# Patient Record
Sex: FEMALE | Race: WHITE | NOT HISPANIC OR LATINO | Employment: OTHER | URBAN - METROPOLITAN AREA
[De-identification: names, ages, dates, MRNs, and addresses within clinical notes are randomized per-mention and may not be internally consistent; named-entity substitution may affect disease eponyms.]

---

## 2023-03-23 ENCOUNTER — TELEPHONE (OUTPATIENT)
Dept: PRIMARY CARE | Facility: CLINIC | Age: 73
End: 2023-03-23
Payer: MEDICARE

## 2023-03-23 DIAGNOSIS — I10 HYPERTENSION, UNSPECIFIED TYPE: ICD-10-CM

## 2023-03-23 NOTE — TELEPHONE ENCOUNTER
Refill:    Lisinopril 5mg daily    [phone]    LR: 12/20/22 qty 90 no refills   LV:11/20/22  NV; 05/02/23

## 2023-03-24 RX ORDER — LISINOPRIL 5 MG/1
5 TABLET ORAL DAILY
COMMUNITY
End: 2023-03-24 | Stop reason: SDUPTHER

## 2023-03-25 RX ORDER — LISINOPRIL 5 MG/1
5 TABLET ORAL DAILY
Qty: 90 TABLET | Refills: 0 | Status: SHIPPED | OUTPATIENT
Start: 2023-03-25 | End: 2023-06-22 | Stop reason: SDUPTHER

## 2023-04-12 ENCOUNTER — TELEPHONE (OUTPATIENT)
Dept: PRIMARY CARE | Facility: CLINIC | Age: 73
End: 2023-04-12
Payer: MEDICARE

## 2023-04-12 DIAGNOSIS — I10 HYPERTENSION, UNSPECIFIED TYPE: ICD-10-CM

## 2023-04-12 PROBLEM — S72.92XD CLOSED FRACTURE OF LEFT FEMUR WITH ROUTINE HEALING: Status: ACTIVE | Noted: 2023-02-10

## 2023-04-12 PROBLEM — M54.12 CERVICAL RADICULOPATHY: Status: ACTIVE | Noted: 2023-04-12

## 2023-04-12 PROBLEM — I44.7 LBBB (LEFT BUNDLE BRANCH BLOCK): Status: ACTIVE | Noted: 2021-01-20
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
## 2023-04-12 PROBLEM — R51.9 UNILATERAL HEADACHE: Status: ACTIVE | Noted: 2023-04-12

## 2023-04-12 PROBLEM — G40.909: Status: ACTIVE | Noted: 2023-04-12

## 2023-04-12 PROBLEM — I34.0 NONRHEUMATIC MITRAL VALVE REGURGITATION: Status: ACTIVE | Noted: 2021-05-04

## 2023-04-12 PROBLEM — G51.0 BELL'S PALSY: Status: ACTIVE | Noted: 2023-04-12

## 2023-04-12 PROBLEM — S50.02XA CONTUSION OF LEFT ELBOW: Status: ACTIVE | Noted: 2021-09-17

## 2023-04-12 PROBLEM — F17.200 NICOTINE USE DISORDER: Status: ACTIVE | Noted: 2021-01-21

## 2023-04-12 PROBLEM — K92.1 HEMATOCHEZIA: Status: ACTIVE | Noted: 2023-04-12

## 2023-04-12 PROBLEM — R73.9 HYPERGLYCEMIA: Status: ACTIVE | Noted: 2023-04-12

## 2023-04-12 PROBLEM — R79.0 LOW BLOOD MAGNESIUM LEVEL: Status: ACTIVE | Noted: 2023-04-12

## 2023-04-12 PROBLEM — I50.22 CHRONIC SYSTOLIC CONGESTIVE HEART FAILURE (MULTI): Status: ACTIVE | Noted: 2021-01-20

## 2023-04-12 PROBLEM — R13.13 PHARYNGEAL DYSPHAGIA: Status: ACTIVE | Noted: 2021-05-05

## 2023-04-12 PROBLEM — I35.0 AORTIC STENOSIS, MILD: Status: ACTIVE | Noted: 2021-05-04

## 2023-04-12 PROBLEM — F32.A DEPRESSION: Status: ACTIVE | Noted: 2023-04-12

## 2023-04-12 PROBLEM — I48.91 ATRIAL FIBRILLATION (MULTI): Status: ACTIVE | Noted: 2023-04-12

## 2023-04-12 PROBLEM — Q23.1 BICUSPID AORTIC VALVE (HHS-HCC): Status: ACTIVE | Noted: 2023-04-12

## 2023-04-12 PROBLEM — E44.0 MALNUTRITION OF MODERATE DEGREE (MULTI): Status: ACTIVE | Noted: 2021-06-23

## 2023-04-12 PROBLEM — B02.29 POSTHERPETIC NEURALGIA: Status: ACTIVE | Noted: 2020-06-25

## 2023-04-12 PROBLEM — M54.9 BACK PAIN: Status: ACTIVE | Noted: 2021-12-26

## 2023-04-12 PROBLEM — I48.0 PAF (PAROXYSMAL ATRIAL FIBRILLATION) (MULTI): Status: ACTIVE | Noted: 2021-06-16

## 2023-04-12 PROBLEM — S32.110A: Status: ACTIVE | Noted: 2021-12-28

## 2023-04-12 PROBLEM — E78.5 HYPERLIPIDEMIA: Status: ACTIVE | Noted: 2021-05-04

## 2023-04-12 PROBLEM — R13.14 PHARYNGOESOPHAGEAL DYSPHAGIA: Status: ACTIVE | Noted: 2018-02-23

## 2023-04-12 PROBLEM — S52.123A RADIAL HEAD FRACTURE: Status: ACTIVE | Noted: 2023-04-12

## 2023-04-12 PROBLEM — H04.123 DRY EYES: Status: ACTIVE | Noted: 2023-04-12

## 2023-04-12 PROBLEM — S83.91XA RIGHT KNEE SPRAIN: Status: ACTIVE | Noted: 2023-04-12

## 2023-04-12 PROBLEM — J30.9 ALLERGIC RHINITIS: Status: ACTIVE | Noted: 2023-04-12

## 2023-04-12 PROBLEM — N18.9 CKD (CHRONIC KIDNEY DISEASE): Chronic | Status: ACTIVE | Noted: 2021-12-28

## 2023-04-12 PROBLEM — N18.30 CKD (CHRONIC KIDNEY DISEASE), STAGE III (MULTI): Status: ACTIVE | Noted: 2023-04-12

## 2023-04-12 PROBLEM — I77.819 AORTIC ECTASIA (CMS-HCC): Status: ACTIVE | Noted: 2023-04-12

## 2023-04-12 PROBLEM — I50.9 CHF (CONGESTIVE HEART FAILURE) (MULTI): Status: ACTIVE | Noted: 2023-04-12

## 2023-04-12 PROBLEM — I36.1 NONRHEUMATIC TRICUSPID VALVE REGURGITATION: Status: ACTIVE | Noted: 2021-05-04

## 2023-04-12 PROBLEM — D64.9 ANEMIA: Status: ACTIVE | Noted: 2023-04-12

## 2023-04-12 PROBLEM — M85.80 OSTEOPENIA: Status: ACTIVE | Noted: 2023-04-12

## 2023-04-12 PROBLEM — K52.831 COLLAGENOUS COLITIS: Status: ACTIVE | Noted: 2023-04-12

## 2023-04-12 PROBLEM — R26.9 ABNORMALITY OF GAIT: Status: ACTIVE | Noted: 2023-02-10

## 2023-04-12 PROBLEM — E55.9 VITAMIN D DEFICIENCY: Status: ACTIVE | Noted: 2023-04-12

## 2023-04-12 PROBLEM — F43.21 GRIEF: Status: ACTIVE | Noted: 2023-04-12

## 2023-04-12 RX ORDER — HYDRALAZINE HYDROCHLORIDE 50 MG/1
50 TABLET, FILM COATED ORAL 3 TIMES DAILY
COMMUNITY
End: 2023-04-12 | Stop reason: SDUPTHER

## 2023-04-12 RX ORDER — HYDRALAZINE HYDROCHLORIDE 50 MG/1
50 TABLET, FILM COATED ORAL 3 TIMES DAILY
Qty: 270 TABLET | Refills: 0 | Status: SHIPPED | OUTPATIENT
Start: 2023-04-12 | End: 2023-10-16 | Stop reason: SDUPTHER

## 2023-04-12 NOTE — TELEPHONE ENCOUNTER
Refill:     Hydralazine 50mg take one tab three times a day    [facility] # [phone]    LR: 01/05/23 qty 270 no refills   LV: 11/02/22   NV: 05/02/23

## 2023-05-01 RX ORDER — SIMVASTATIN 10 MG/1
10 TABLET, FILM COATED ORAL NIGHTLY
COMMUNITY
End: 2023-05-26

## 2023-05-01 RX ORDER — MAGNESIUM 64 MG (MAGNESIUM CHLORIDE) TABLET,DELAYED RELEASE
64 2 TIMES DAILY
COMMUNITY

## 2023-05-01 RX ORDER — PANTOPRAZOLE SODIUM 40 MG/1
TABLET, DELAYED RELEASE ORAL
COMMUNITY

## 2023-05-01 RX ORDER — ALLOPURINOL 100 MG/1
100 TABLET ORAL DAILY
COMMUNITY
End: 2023-06-16 | Stop reason: SDUPTHER

## 2023-05-01 RX ORDER — FUROSEMIDE 20 MG/1
20 TABLET ORAL DAILY
COMMUNITY
End: 2024-01-16 | Stop reason: SDUPTHER

## 2023-05-01 RX ORDER — APIXABAN 5 MG/1
5 TABLET, FILM COATED ORAL 2 TIMES DAILY
COMMUNITY

## 2023-05-01 RX ORDER — ALENDRONATE SODIUM 70 MG/1
70 TABLET ORAL WEEKLY
COMMUNITY
Start: 2023-01-09 | End: 2023-08-22 | Stop reason: SDUPTHER

## 2023-05-01 RX ORDER — FERROUS SULFATE 325(65) MG
1 TABLET ORAL 2 TIMES DAILY
COMMUNITY

## 2023-05-01 RX ORDER — TRAZODONE HYDROCHLORIDE 50 MG/1
50 TABLET ORAL NIGHTLY PRN
COMMUNITY
End: 2024-05-07 | Stop reason: SINTOL

## 2023-05-01 RX ORDER — OXYCODONE HYDROCHLORIDE 5 MG/1
TABLET ORAL
COMMUNITY
Start: 2023-04-17 | End: 2023-05-02 | Stop reason: ALTCHOICE

## 2023-05-01 RX ORDER — METOPROLOL SUCCINATE 50 MG/1
50 TABLET, EXTENDED RELEASE ORAL
COMMUNITY
Start: 2023-02-01 | End: 2023-05-02 | Stop reason: SDUPTHER

## 2023-05-02 ENCOUNTER — OFFICE VISIT (OUTPATIENT)
Dept: PRIMARY CARE | Facility: CLINIC | Age: 73
End: 2023-05-02
Payer: MEDICARE

## 2023-05-02 VITALS
SYSTOLIC BLOOD PRESSURE: 122 MMHG | BODY MASS INDEX: 24.73 KG/M2 | HEIGHT: 61 IN | TEMPERATURE: 96 F | DIASTOLIC BLOOD PRESSURE: 70 MMHG | WEIGHT: 131 LBS

## 2023-05-02 DIAGNOSIS — E55.9 VITAMIN D DEFICIENCY: ICD-10-CM

## 2023-05-02 DIAGNOSIS — D12.6 ADENOMATOUS POLYP OF COLON, UNSPECIFIED PART OF COLON: ICD-10-CM

## 2023-05-02 DIAGNOSIS — I35.0 AORTIC STENOSIS, MILD: ICD-10-CM

## 2023-05-02 DIAGNOSIS — I50.22 CHRONIC SYSTOLIC CONGESTIVE HEART FAILURE (MULTI): ICD-10-CM

## 2023-05-02 DIAGNOSIS — I34.0 NONRHEUMATIC MITRAL VALVE REGURGITATION: ICD-10-CM

## 2023-05-02 DIAGNOSIS — Z12.31 ENCOUNTER FOR SCREENING MAMMOGRAM FOR MALIGNANT NEOPLASM OF BREAST: ICD-10-CM

## 2023-05-02 DIAGNOSIS — N18.31 ANEMIA DUE TO STAGE 3A CHRONIC KIDNEY DISEASE (MULTI): ICD-10-CM

## 2023-05-02 DIAGNOSIS — M85.80 OSTEOPENIA, UNSPECIFIED LOCATION: ICD-10-CM

## 2023-05-02 DIAGNOSIS — I10 HYPERTENSION, UNSPECIFIED TYPE: ICD-10-CM

## 2023-05-02 DIAGNOSIS — I48.11 LONGSTANDING PERSISTENT ATRIAL FIBRILLATION (MULTI): ICD-10-CM

## 2023-05-02 DIAGNOSIS — E78.5 HYPERLIPIDEMIA, UNSPECIFIED HYPERLIPIDEMIA TYPE: ICD-10-CM

## 2023-05-02 DIAGNOSIS — F17.200 NICOTINE USE DISORDER: ICD-10-CM

## 2023-05-02 DIAGNOSIS — Z00.00 ROUTINE GENERAL MEDICAL EXAMINATION AT HEALTH CARE FACILITY: Primary | ICD-10-CM

## 2023-05-02 DIAGNOSIS — Z00.00 HEALTH CARE MAINTENANCE: ICD-10-CM

## 2023-05-02 DIAGNOSIS — I36.1 NONRHEUMATIC TRICUSPID VALVE REGURGITATION: ICD-10-CM

## 2023-05-02 DIAGNOSIS — M54.12 CERVICAL RADICULOPATHY: ICD-10-CM

## 2023-05-02 DIAGNOSIS — D63.1 ANEMIA DUE TO STAGE 3A CHRONIC KIDNEY DISEASE (MULTI): ICD-10-CM

## 2023-05-02 DIAGNOSIS — R73.9 HYPERGLYCEMIA: ICD-10-CM

## 2023-05-02 PROBLEM — G72.41: Status: ACTIVE | Noted: 2023-05-02

## 2023-05-02 PROBLEM — N18.30 CKD (CHRONIC KIDNEY DISEASE), STAGE III (MULTI): Chronic | Status: ACTIVE | Noted: 2021-12-28

## 2023-05-02 PROBLEM — K92.1 HEMATOCHEZIA: Status: RESOLVED | Noted: 2023-04-12 | Resolved: 2023-05-02
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
## 2023-05-02 PROBLEM — R51.9 UNILATERAL HEADACHE: Status: RESOLVED | Noted: 2023-04-12 | Resolved: 2023-05-02

## 2023-05-02 PROCEDURE — 3078F DIAST BP <80 MM HG: CPT | Performed by: FAMILY MEDICINE

## 2023-05-02 PROCEDURE — G0442 ANNUAL ALCOHOL SCREEN 15 MIN: HCPCS | Performed by: FAMILY MEDICINE

## 2023-05-02 PROCEDURE — 1159F MED LIST DOCD IN RCRD: CPT | Performed by: FAMILY MEDICINE

## 2023-05-02 PROCEDURE — G0439 PPPS, SUBSEQ VISIT: HCPCS | Performed by: FAMILY MEDICINE

## 2023-05-02 PROCEDURE — 99497 ADVNCD CARE PLAN 30 MIN: CPT | Performed by: FAMILY MEDICINE

## 2023-05-02 PROCEDURE — G0446 INTENS BEHAVE THER CARDIO DX: HCPCS | Performed by: FAMILY MEDICINE

## 2023-05-02 PROCEDURE — 1160F RVW MEDS BY RX/DR IN RCRD: CPT | Performed by: FAMILY MEDICINE

## 2023-05-02 PROCEDURE — 99214 OFFICE O/P EST MOD 30 MIN: CPT | Performed by: FAMILY MEDICINE

## 2023-05-02 PROCEDURE — 1123F ACP DISCUSS/DSCN MKR DOCD: CPT | Performed by: FAMILY MEDICINE

## 2023-05-02 PROCEDURE — G0444 DEPRESSION SCREEN ANNUAL: HCPCS | Performed by: FAMILY MEDICINE

## 2023-05-02 PROCEDURE — 3074F SYST BP LT 130 MM HG: CPT | Performed by: FAMILY MEDICINE

## 2023-05-02 PROCEDURE — 1170F FXNL STATUS ASSESSED: CPT | Performed by: FAMILY MEDICINE

## 2023-05-02 RX ORDER — METOPROLOL SUCCINATE 50 MG/1
50 TABLET, EXTENDED RELEASE ORAL
Qty: 90 TABLET | Refills: 1 | Status: SHIPPED | OUTPATIENT
Start: 2023-05-02 | End: 2023-10-31 | Stop reason: SDUPTHER

## 2023-05-02 ASSESSMENT — ACTIVITIES OF DAILY LIVING (ADL)
BATHING: INDEPENDENT
DOING_HOUSEWORK: INDEPENDENT
MANAGING_FINANCES: INDEPENDENT
GROCERY_SHOPPING: INDEPENDENT
DRESSING: INDEPENDENT
TAKING_MEDICATION: INDEPENDENT

## 2023-05-02 ASSESSMENT — ENCOUNTER SYMPTOMS
OCCASIONAL FEELINGS OF UNSTEADINESS: 0
DEPRESSION: 0
LOSS OF SENSATION IN FEET: 0

## 2023-05-02 ASSESSMENT — PATIENT HEALTH QUESTIONNAIRE - PHQ9
SUM OF ALL RESPONSES TO PHQ9 QUESTIONS 1 AND 2: 0
1. LITTLE INTEREST OR PLEASURE IN DOING THINGS: NOT AT ALL
2. FEELING DOWN, DEPRESSED OR HOPELESS: NOT AT ALL

## 2023-05-02 NOTE — PATIENT INSTRUCTIONS
Quitting smoking is the single most important thing you can do for your health.  We can help.     Let me know how your physical therapy goes to prevent falling.    Follow up fasting (no alcohol for 48 hours and just water for 14 hours) in six months for your next routine appointment.  In general, take any medications on schedule (except for types of Insulin).

## 2023-05-02 NOTE — PROGRESS NOTES
Subjective   Reason for Visit: Lianna Nuñez is an 72 y.o. female here for a Medicare Wellness visit.     Past Medical, Surgical, and Family History reviewed and updated in chart.In the past 2 weeks this patient has not felt down, depressed, hopeless, lost interest or pleasure in doing things or thought of harming herself or others. The patient has not fallen in the last six months and has no loose rugs,  uneven floors or poor lighting at home.Advance Care Planning discussion was held.  The patient has no spiritual/Moravian/cultural values or needs that we need to know. The patient does not feel threatened or abused physically, emotionally or sexually.  The patient feels safe in the home.  In the past month, there was not a day when the patient of anyone in the patient's family went hungry because there was not enough food.  The patient denies that they or the person with them has problems with hearing, speaking, reading, moving around or learning.  The patient states they are comfortable filling out medical forms.        Reviewed all medications by prescribing practitioner or clinical pharmacist (such as prescriptions, OTCs, herbal therapies and supplements) and documented in the medical record.    HPI  Taking meds as directed without tolerability or affordability issues; no complaints today.    Cardiovascular risk discussed and, if needed, lifestyle modifications recommended, including nutritional choices, exercise, and elimination of habits contributing to risk.  We agreed on a plan to reduce  the current cardiovascular risk.  See the ASCVD   Plus for data  discussed regarding risk and risk reduction opportunities.  Aspirin use/disuse was discussed after reviewing updated guidelines.    Patient Care Team:  Roberto Marshall MD as PCP - General  Roberto Marshall MD as PCP - [employer] PCP     Review of Systems  Cardiovascular- No chest pain or pressure, nausea, diaphoresis, paresthesias,  "dizziness, or syncope with or without exertion  Gastrointestinal-No blood in stool, tarry stools, pain, vomiting, heartburn, constipation or diarrhea  Pulmonary-No wheezing, coughing or shortness of breath  Urology-No frequency, urgency, blood in urine, or incontinence  Musculoskeletal-No locking, giving way/swelling of joints  Neurology- No headaches, hx of concussion, falls in the last year or seizure;  weakness progressing in her left hand follwed by Dr Martin at [facility]  Allergy/Immunology-No history of sneezing or itching  Dermatology-No rashes, blanching or  change in any moles    Objective   Vitals:  /70 (BP Location: Left arm, Patient Position: Sitting)   Temp 35.6 °C (96 °F) (Skin)   Ht 1.549 m (5' 1\")   Wt 59.4 kg (131 lb)   BMI 24.75 kg/m²       Physical Exam  Neck-supple without lymphadenopathy or thyromegaly; no carotid bruits  Heart- regular rate and rhythm, normal s1 and s2 without  gallop;  no edema; grade II systolic murmur on both sides of sternal border  Lungs-clear to auscultation  Abdomen-soft, positive bowel sounds, without masses, HSmegaly or pain   Neuro- has no strength in third, fourth and fifth fingers of the left hand  Assessment/Plan   Problem List Items Addressed This Visit          Nervous    Cervical radiculopathy       Circulatory    Aortic stenosis, mild    Overview     Last Assessment & Plan: Formatting of this note might be different from the original. Assessment: mild. Follows with cardiology         Relevant Medications    metoprolol succinate XL (Toprol-XL) 50 mg 24 hr tablet    Chronic systolic congestive heart failure (CMS/HCC)    Overview     Last Assessment & Plan: Formatting of this note might be different from the original. Assessment: stable PLAN: continue meds,         Relevant Medications    metoprolol succinate XL (Toprol-XL) 50 mg 24 hr tablet    Hypertension    Overview     Last Assessment & Plan: Formatting of this note might be different from the original. " Assessment: stable PLAN: on meds, monitor post op         Relevant Orders    Comprehensive Metabolic Panel    Lipid Panel    Thyroid Stimulating Hormone    Nonrheumatic mitral valve regurgitation    Overview     Last Assessment & Plan: Formatting of this note might be different from the original. Assessment: stable PLAN: follows cardiology         Relevant Medications    metoprolol succinate XL (Toprol-XL) 50 mg 24 hr tablet    Nonrheumatic tricuspid valve regurgitation    Overview     Last Assessment & Plan: Formatting of this note might be different from the original. Assessment: stable PLAN: follows cardiology         Relevant Medications    metoprolol succinate XL (Toprol-XL) 50 mg 24 hr tablet       Musculoskeletal    Osteopenia       Endocrine/Metabolic    Vitamin D deficiency    Relevant Orders    Vitamin D 1,25 Dihydroxy       Hematologic    Anemia       Other    Hyperglycemia    Relevant Orders    Glucose Tolerance Test, 2 hour (Non-Pregnancy)    Hyperlipidemia    Overview     Last Assessment & Plan: Formatting of this note might be different from the original. Assessment: on simvastatin         Relevant Orders    Comprehensive Metabolic Panel    Lipid Panel    Thyroid Stimulating Hormone    Nicotine use disorder    Overview     Last Assessment & Plan: Formatting of this note might be different from the original. Assessment: down to smoking <5 cigarettes daily          Other Visit Diagnoses       Routine general medical examination at health care facility    -  Primary    Encounter for screening mammogram for malignant neoplasm of breast        Relevant Orders    BI mammo bilateral screening tomosynthesis    Longstanding persistent atrial fibrillation (CMS/HCC)        Relevant Medications    metoprolol succinate XL (Toprol-XL) 50 mg 24 hr tablet

## 2023-05-08 ENCOUNTER — LAB (OUTPATIENT)
Dept: LAB | Facility: LAB | Age: 73
End: 2023-05-08
Payer: MEDICARE

## 2023-05-08 DIAGNOSIS — N18.31 ANEMIA DUE TO STAGE 3A CHRONIC KIDNEY DISEASE (MULTI): ICD-10-CM

## 2023-05-08 DIAGNOSIS — R73.9 HYPERGLYCEMIA: ICD-10-CM

## 2023-05-08 DIAGNOSIS — D63.1 ANEMIA DUE TO STAGE 3A CHRONIC KIDNEY DISEASE (MULTI): ICD-10-CM

## 2023-05-08 DIAGNOSIS — E55.9 VITAMIN D DEFICIENCY: ICD-10-CM

## 2023-05-08 DIAGNOSIS — I10 HYPERTENSION, UNSPECIFIED TYPE: ICD-10-CM

## 2023-05-08 DIAGNOSIS — E78.5 HYPERLIPIDEMIA, UNSPECIFIED HYPERLIPIDEMIA TYPE: ICD-10-CM

## 2023-05-08 LAB
BASOPHILS (10*3/UL) IN BLOOD BY AUTOMATED COUNT: 0.02 X10E9/L (ref 0–0.1)
BASOPHILS/100 LEUKOCYTES IN BLOOD BY AUTOMATED COUNT: 0.3 % (ref 0–2)
CHOLESTEROL (MG/DL) IN SER/PLAS: 118 MG/DL (ref 0–199)
CHOLESTEROL IN HDL (MG/DL) IN SER/PLAS: 51.8 MG/DL
CHOLESTEROL/HDL RATIO: 2.3
EOSINOPHILS (10*3/UL) IN BLOOD BY AUTOMATED COUNT: 0.06 X10E9/L (ref 0–0.4)
EOSINOPHILS/100 LEUKOCYTES IN BLOOD BY AUTOMATED COUNT: 0.9 % (ref 0–6)
ERYTHROCYTE DISTRIBUTION WIDTH (RATIO) BY AUTOMATED COUNT: 16.2 % (ref 11.5–14.5)
ERYTHROCYTE MEAN CORPUSCULAR HEMOGLOBIN CONCENTRATION (G/DL) BY AUTOMATED: 30.6 G/DL (ref 32–36)
ERYTHROCYTE MEAN CORPUSCULAR VOLUME (FL) BY AUTOMATED COUNT: 108 FL (ref 80–100)
ERYTHROCYTES (10*6/UL) IN BLOOD BY AUTOMATED COUNT: 2.78 X10E12/L (ref 4–5.2)
GLUCOSE TOLERANCE TEST FASTING: 93 MG/DL
GLUCOSE TOLERANCE TEST TWO HOUR: 96 MG/DL
GTTC3: NORMAL
HEMATOCRIT (%) IN BLOOD BY AUTOMATED COUNT: 30.1 % (ref 36–46)
HEMOGLOBIN (G/DL) IN BLOOD: 9.2 G/DL (ref 12–16)
IMMATURE GRANULOCYTES/100 LEUKOCYTES IN BLOOD BY AUTOMATED COUNT: 0.4 % (ref 0–0.9)
LDL: 45 MG/DL (ref 0–99)
LEUKOCYTES (10*3/UL) IN BLOOD BY AUTOMATED COUNT: 6.7 X10E9/L (ref 4.4–11.3)
LYMPHOCYTES (10*3/UL) IN BLOOD BY AUTOMATED COUNT: 1.52 X10E9/L (ref 0.8–3)
LYMPHOCYTES/100 LEUKOCYTES IN BLOOD BY AUTOMATED COUNT: 22.7 % (ref 13–44)
MONOCYTES (10*3/UL) IN BLOOD BY AUTOMATED COUNT: 0.6 X10E9/L (ref 0.05–0.8)
MONOCYTES/100 LEUKOCYTES IN BLOOD BY AUTOMATED COUNT: 9 % (ref 2–10)
NEUTROPHILS (10*3/UL) IN BLOOD BY AUTOMATED COUNT: 4.46 X10E9/L (ref 1.6–5.5)
NEUTROPHILS/100 LEUKOCYTES IN BLOOD BY AUTOMATED COUNT: 66.7 % (ref 40–80)
NRBC (PER 100 WBCS) BY AUTOMATED COUNT: 0 /100 WBC (ref 0–0)
PLATELETS (10*3/UL) IN BLOOD AUTOMATED COUNT: 283 X10E9/L (ref 150–450)
THYROTROPIN (MIU/L) IN SER/PLAS BY DETECTION LIMIT <= 0.05 MIU/L: 3.44 MIU/L (ref 0.44–3.98)
TRIGLYCERIDE (MG/DL) IN SER/PLAS: 106 MG/DL (ref 0–149)
VLDL: 21 MG/DL (ref 0–40)

## 2023-05-08 PROCEDURE — 80053 COMPREHEN METABOLIC PANEL: CPT

## 2023-05-08 PROCEDURE — 82306 VITAMIN D 25 HYDROXY: CPT

## 2023-05-08 PROCEDURE — 84443 ASSAY THYROID STIM HORMONE: CPT

## 2023-05-08 PROCEDURE — 85025 COMPLETE CBC W/AUTO DIFF WBC: CPT

## 2023-05-08 PROCEDURE — 80061 LIPID PANEL: CPT

## 2023-05-08 PROCEDURE — 82947 ASSAY GLUCOSE BLOOD QUANT: CPT

## 2023-05-08 PROCEDURE — 36415 COLL VENOUS BLD VENIPUNCTURE: CPT

## 2023-05-08 PROCEDURE — 82652 VIT D 1 25-DIHYDROXY: CPT

## 2023-05-08 PROCEDURE — 82950 GLUCOSE TEST: CPT

## 2023-05-09 LAB
ALANINE AMINOTRANSFERASE (SGPT) (U/L) IN SER/PLAS: 13 U/L (ref 7–45)
ALBUMIN (G/DL) IN SER/PLAS: 4 G/DL (ref 3.4–5)
ALKALINE PHOSPHATASE (U/L) IN SER/PLAS: 83 U/L (ref 33–136)
ANION GAP IN SER/PLAS: 17 MMOL/L (ref 10–20)
ASPARTATE AMINOTRANSFERASE (SGOT) (U/L) IN SER/PLAS: 23 U/L (ref 9–39)
BILIRUBIN TOTAL (MG/DL) IN SER/PLAS: 0.4 MG/DL (ref 0–1.2)
CALCIUM (MG/DL) IN SER/PLAS: 9.9 MG/DL (ref 8.6–10.6)
CARBON DIOXIDE, TOTAL (MMOL/L) IN SER/PLAS: 25 MMOL/L (ref 21–32)
CHLORIDE (MMOL/L) IN SER/PLAS: 103 MMOL/L (ref 98–107)
CREATININE (MG/DL) IN SER/PLAS: 1.14 MG/DL (ref 0.5–1.05)
GFR FEMALE: 51 ML/MIN/1.73M2
GLUCOSE (MG/DL) IN SER/PLAS: 87 MG/DL (ref 74–99)
POTASSIUM (MMOL/L) IN SER/PLAS: 5.2 MMOL/L (ref 3.5–5.3)
PROTEIN TOTAL: 6.9 G/DL (ref 6.4–8.2)
SODIUM (MMOL/L) IN SER/PLAS: 140 MMOL/L (ref 136–145)
UREA NITROGEN (MG/DL) IN SER/PLAS: 28 MG/DL (ref 6–23)

## 2023-05-10 LAB — VITAMIN D 1,25-DIHYDROXY: 24.6 PG/ML (ref 19.9–79.3)

## 2023-05-11 LAB — CALCIDIOL (25 OH VITAMIN D3) (NG/ML) IN SER/PLAS: 39 NG/ML

## 2023-06-15 ENCOUNTER — TELEPHONE (OUTPATIENT)
Dept: PRIMARY CARE | Facility: CLINIC | Age: 73
End: 2023-06-15
Payer: MEDICARE

## 2023-06-15 DIAGNOSIS — N18.30 STAGE 3 CHRONIC KIDNEY DISEASE, UNSPECIFIED WHETHER STAGE 3A OR 3B CKD (MULTI): Chronic | ICD-10-CM

## 2023-06-15 NOTE — TELEPHONE ENCOUNTER
Refill:    Allopurinol 100mg daily    [facility] [phone]    LR: 11/02/22 qty 90 w/ 1 refill  LV: 05/02/23   NV: 11/03/23

## 2023-06-16 RX ORDER — ALLOPURINOL 100 MG/1
100 TABLET ORAL DAILY
Qty: 90 TABLET | Refills: 0 | Status: SHIPPED | OUTPATIENT
Start: 2023-06-16 | End: 2023-09-14 | Stop reason: SDUPTHER

## 2023-06-22 ENCOUNTER — TELEPHONE (OUTPATIENT)
Dept: PRIMARY CARE | Facility: CLINIC | Age: 73
End: 2023-06-22
Payer: MEDICARE

## 2023-06-22 DIAGNOSIS — I10 HYPERTENSION, UNSPECIFIED TYPE: ICD-10-CM

## 2023-06-22 NOTE — TELEPHONE ENCOUNTER
Refill:    Lisinopril 5mg daily    [facility] [phone]    LR: 03/25/23 qty 90 no refill  LV: 05/08/23   NV: 11/03/23   povidone iodine

## 2023-06-26 RX ORDER — LISINOPRIL 5 MG/1
5 TABLET ORAL DAILY
Qty: 90 TABLET | Refills: 0 | Status: SHIPPED | OUTPATIENT
Start: 2023-06-26 | End: 2023-09-14 | Stop reason: SDUPTHER

## 2023-07-24 ENCOUNTER — TELEPHONE (OUTPATIENT)
Dept: PRIMARY CARE | Facility: CLINIC | Age: 73
End: 2023-07-24
Payer: MEDICARE

## 2023-07-24 DIAGNOSIS — M85.80 OSTEOPENIA, UNSPECIFIED LOCATION: ICD-10-CM

## 2023-07-24 NOTE — TELEPHONE ENCOUNTER
Pt is requesting a refill on      Calcium carbonate-Vit D  500-5 mcg powder packet bid   LR 01/09/2023  # 180 x 1    [facility]  [phone]  NV  11/03/2023

## 2023-08-03 ENCOUNTER — TELEPHONE (OUTPATIENT)
Dept: PRIMARY CARE | Facility: CLINIC | Age: 73
End: 2023-08-03
Payer: MEDICARE

## 2023-08-03 DIAGNOSIS — M85.80 OSTEOPENIA, UNSPECIFIED LOCATION: ICD-10-CM

## 2023-08-03 RX ORDER — MULTIVITAMIN
1 TABLET ORAL 2 TIMES DAILY
Qty: 60 TABLET | Refills: 0 | Status: SHIPPED | OUTPATIENT
Start: 2023-08-03 | End: 2023-09-02

## 2023-08-03 NOTE — TELEPHONE ENCOUNTER
Pt is calling she states that she called in for a refill for her for Calcium 600mg +D3 -600-10mg take one tab twice a day and it was sent in powder form. Can this please be resent as tablet form    [facility] [phone]    LR: 01/09/23 qty 180 w/ 1 refill  LV: 01/09/23  NV: 11/03/23

## 2023-08-21 ENCOUNTER — TELEPHONE (OUTPATIENT)
Dept: PRIMARY CARE | Facility: CLINIC | Age: 73
End: 2023-08-21
Payer: MEDICARE

## 2023-08-21 DIAGNOSIS — M85.80 OSTEOPENIA, UNSPECIFIED LOCATION: ICD-10-CM

## 2023-08-21 NOTE — TELEPHONE ENCOUNTER
REFILL  MEDICATION:     Alendronate 70 MG; Take 1 tablet once a week.     [facility]: [phone]     LR: 1-9-23  12 tablets with 1 refill  LV: 5-2-23  NV: 11-3-23

## 2023-08-23 RX ORDER — ALENDRONATE SODIUM 70 MG/1
70 TABLET ORAL
Qty: 12 TABLET | Refills: 0 | Status: SHIPPED | OUTPATIENT
Start: 2023-08-23 | End: 2024-02-05 | Stop reason: SDUPTHER

## 2023-09-08 DIAGNOSIS — E78.5 HYPERLIPIDEMIA, UNSPECIFIED: ICD-10-CM

## 2023-09-08 RX ORDER — SIMVASTATIN 10 MG/1
TABLET, FILM COATED ORAL
Qty: 90 TABLET | Refills: 0 | Status: SHIPPED | OUTPATIENT
Start: 2023-09-08 | End: 2024-04-11 | Stop reason: SDUPTHER

## 2023-09-14 ENCOUNTER — TELEPHONE (OUTPATIENT)
Dept: PRIMARY CARE | Facility: CLINIC | Age: 73
End: 2023-09-14
Payer: MEDICARE

## 2023-09-14 DIAGNOSIS — N18.30 STAGE 3 CHRONIC KIDNEY DISEASE, UNSPECIFIED WHETHER STAGE 3A OR 3B CKD (MULTI): Chronic | ICD-10-CM

## 2023-09-14 DIAGNOSIS — I10 HYPERTENSION, UNSPECIFIED TYPE: ICD-10-CM

## 2023-09-14 RX ORDER — ALLOPURINOL 100 MG/1
100 TABLET ORAL DAILY
Qty: 90 TABLET | Refills: 0 | Status: SHIPPED | OUTPATIENT
Start: 2023-09-14 | End: 2023-12-13

## 2023-09-14 RX ORDER — LISINOPRIL 5 MG/1
5 TABLET ORAL DAILY
Qty: 90 TABLET | Refills: 0 | Status: SHIPPED | OUTPATIENT
Start: 2023-09-14 | End: 2023-11-03

## 2023-09-14 NOTE — TELEPHONE ENCOUNTER
Refill:    1) Lisinopril 5mg daily    2)Allopurinol 100mg daily    [facility] # [phone]    LR: 06/26/23 qty 90 no refills  LV: 05/02/23  NV: 11/03/23

## 2023-10-16 DIAGNOSIS — I10 HYPERTENSION, UNSPECIFIED TYPE: ICD-10-CM

## 2023-10-16 RX ORDER — HYDRALAZINE HYDROCHLORIDE 50 MG/1
50 TABLET, FILM COATED ORAL 3 TIMES DAILY
Qty: 90 TABLET | Refills: 0 | Status: SHIPPED | OUTPATIENT
Start: 2023-10-16 | End: 2023-11-13 | Stop reason: SDUPTHER

## 2023-10-16 NOTE — TELEPHONE ENCOUNTER
Dr. BLACKWOOD 's pt needs a refill and Dr. PETTY pt is covering     Refill:    Hydralazine 50mg take one tab 3 times a day    [facility] [phone]    LR: 04/12/23 qty 270 no refills  LV: 09/11/23   NV: 11/03/23

## 2023-10-31 ENCOUNTER — TELEPHONE (OUTPATIENT)
Dept: PRIMARY CARE | Facility: CLINIC | Age: 73
End: 2023-10-31
Payer: MEDICARE

## 2023-10-31 DIAGNOSIS — I10 HYPERTENSION, UNSPECIFIED TYPE: ICD-10-CM

## 2023-10-31 RX ORDER — METOPROLOL SUCCINATE 50 MG/1
50 TABLET, EXTENDED RELEASE ORAL DAILY
Qty: 90 TABLET | Refills: 1 | Status: SHIPPED | OUTPATIENT
Start: 2023-10-31 | End: 2024-05-07

## 2023-10-31 NOTE — TELEPHONE ENCOUNTER
Refill:    Metoprolol Succ XL 50mg daily    [facility] [phone]    LR: 05/02/23 qty 90 no refills  LV: 05/02/23  NV: 11/03/23

## 2023-11-03 ENCOUNTER — OFFICE VISIT (OUTPATIENT)
Dept: PRIMARY CARE | Facility: CLINIC | Age: 73
End: 2023-11-03
Payer: MEDICARE

## 2023-11-03 VITALS
SYSTOLIC BLOOD PRESSURE: 134 MMHG | DIASTOLIC BLOOD PRESSURE: 91 MMHG | BODY MASS INDEX: 24.94 KG/M2 | WEIGHT: 132 LBS | TEMPERATURE: 97.8 F

## 2023-11-03 DIAGNOSIS — I10 HYPERTENSION, UNSPECIFIED TYPE: ICD-10-CM

## 2023-11-03 DIAGNOSIS — Z23 ENCOUNTER FOR IMMUNIZATION: ICD-10-CM

## 2023-11-03 DIAGNOSIS — E78.5 HYPERLIPIDEMIA, UNSPECIFIED HYPERLIPIDEMIA TYPE: ICD-10-CM

## 2023-11-03 DIAGNOSIS — D63.1 ANEMIA DUE TO STAGE 3A CHRONIC KIDNEY DISEASE (MULTI): ICD-10-CM

## 2023-11-03 DIAGNOSIS — G72.41 INCLUSION BODY MYOSITIS (IBM) (CMS-HCC): Primary | ICD-10-CM

## 2023-11-03 DIAGNOSIS — M46.1 SACROILIITIS, NOT ELSEWHERE CLASSIFIED (CMS-HCC): ICD-10-CM

## 2023-11-03 DIAGNOSIS — R73.9 HYPERGLYCEMIA: Primary | ICD-10-CM

## 2023-11-03 DIAGNOSIS — N18.31 ANEMIA DUE TO STAGE 3A CHRONIC KIDNEY DISEASE (MULTI): ICD-10-CM

## 2023-11-03 DIAGNOSIS — G40.909: ICD-10-CM

## 2023-11-03 DIAGNOSIS — R26.9 ABNORMALITY OF GAIT: ICD-10-CM

## 2023-11-03 LAB
POC FINGERSTICK BLOOD GLUCOSE: 125 MG/DL (ref 70–100)
POC HDL CHOLESTEROL: 49 MG/DL (ref 0–50)
POC LDL CHOLESTEROL: 42 MG/DL (ref 0–100)
POC NON-HDL CHOLESTEROL: 71 MG/DL (ref 0–130)
POC TOTAL CHOLESTEROL/HDL RATIO: 2.5 (ref 0–4.5)
POC TOTAL CHOLESTEROL: 120 MG/DL (ref 0–199)
POC TRIGLYCERIDES: 147 MG/DL (ref 0–150)

## 2023-11-03 PROCEDURE — 82962 GLUCOSE BLOOD TEST: CPT | Performed by: FAMILY MEDICINE

## 2023-11-03 PROCEDURE — 3080F DIAST BP >= 90 MM HG: CPT | Performed by: FAMILY MEDICINE

## 2023-11-03 PROCEDURE — 90662 IIV NO PRSV INCREASED AG IM: CPT | Performed by: FAMILY MEDICINE

## 2023-11-03 PROCEDURE — 3075F SYST BP GE 130 - 139MM HG: CPT | Performed by: FAMILY MEDICINE

## 2023-11-03 PROCEDURE — 80061 LIPID PANEL: CPT | Performed by: FAMILY MEDICINE

## 2023-11-03 PROCEDURE — G0008 ADMIN INFLUENZA VIRUS VAC: HCPCS | Performed by: FAMILY MEDICINE

## 2023-11-03 PROCEDURE — 99214 OFFICE O/P EST MOD 30 MIN: CPT | Performed by: FAMILY MEDICINE

## 2023-11-03 PROCEDURE — 1160F RVW MEDS BY RX/DR IN RCRD: CPT | Performed by: FAMILY MEDICINE

## 2023-11-03 PROCEDURE — 1159F MED LIST DOCD IN RCRD: CPT | Performed by: FAMILY MEDICINE

## 2023-11-03 RX ORDER — LISINOPRIL 10 MG/1
10 TABLET ORAL DAILY
Qty: 90 TABLET | Refills: 1 | Status: SHIPPED | OUTPATIENT
Start: 2023-11-03 | End: 2024-05-07

## 2023-11-03 ASSESSMENT — ENCOUNTER SYMPTOMS
DEPRESSION: 0
OCCASIONAL FEELINGS OF UNSTEADINESS: 1
LOSS OF SENSATION IN FEET: 0

## 2023-11-03 NOTE — PATIENT INSTRUCTIONS
Quitting smoking is the single most important thing you can do for your health.  We can help.     Return in one week to be reevaluated on the increased Lisinopril and then follow up fasting (no alcohol for 48 hours and just water for 14 hours) in six months for your next routine appointment.  In general, take any medications on schedule (except for types of Insulin).              Ways to Help Prevent Falls at Home    Quick Tips   ? Ask for help if you need it. Most people want to help!   ? Get up slowly after sitting or laying down   ? Wear a medical alert device or keep cell phone in your pocket   ? Use night lights, especially areas near a bathroom   ? Keep the items you use often within reach on a small stool or end table   ? Use an assistive device such as walker or cane, as directed by provider/physical therapy   ? Use a non-slip mat and grab bars in your bathroom. Look for home health sections for best options     Other Areas to Focus On   ? Exercise and nutrition: Regular exercise or taking a falls prevention class are great ways improve strength and balance. Don’t forget to stay hydrated and bring a snack!   ? Medicine side effects: Some medicines can make you sleepy or dizzy, which could cause a fall. Ask your healthcare provider about the side effects your medicines could cause. Be sure to let them know if you take any vitamins or supplements as well.   ? Tripping hazards: Remove items you could trip on, such as loose mats, rugs, cords, and clutter. Wear closed toe shoes with rubber soles.   ? Health and wellness: Get regular checkups with your healthcare provider, plus routine vision and hearing screenings. Talk with your healthcare provider about:   o Your medicines and the possible side effects - bring them in a bag if that is easier!   o Problems with balance or feeling dizzy   o Ways to promote bone health, such as Vitamin D and calcium supplements   o Questions or concerns about falling     *Ask your  healthcare team if you have questions     ©[facility], 2022

## 2023-11-03 NOTE — PROGRESS NOTES
Subjective   Patient ID: [id number]     Lianna Nuñez is a 73 y.o. female who presents for Med Management.    HPI  Lianna is starting OT, PT this week.  Here wit a walker.    Review of Systems  GEN-denies, fever, weakness or myalgias; no unexplained fever or chills  OPTH-No dry eyes, itchy eyes, or blurry vision   ENT-No hearing loss, tinnitus or vertigo  NECK-no stiffness, swelling or pain  PSYCH-No complaints regarding libido, appetite, memory or concentration;  no drug use or alcohol usage over six per week  SLEEP-No complaints of insomnia, apnea or restless legs  ALL/IMMUN-No history of sneezing or itching  HEM-No unexplained bruising or bleeding  MS- can get left leg pain occasionally without weakness or incontinence    Objective     BP (!) 134/91 (BP Location: Right arm, Patient Position: Sitting, BP Cuff Size: Adult)   Temp 36.6 °C (97.8 °F) (Oral)   Wt 59.9 kg (132 lb)   BMI 24.94 kg/m²      Physical Exam  Eyes-pupils equal round, reactive to light and accommodation, fundi with normal cup/disc ratio, conjunctiva without redness or discharge  General- well defined, well nourished, well hydrated individual in NAD  Skin- normal color and turgor; without nail pitting  Head-normocephalic without masses or tenderness  Ears-normal pinnae, and canals, with normal landmarks and light reflex of tympanic membranes bilaterally  Nose-septum in the midline, normal mucosa bilaterally  Throat- without erythema or exudate, uvula in midline  Neck-supple without lymphadenopathy or thyromegaly; no carotid bruits  Heart- regular rate and rhythm, normal s1 and s2 without murmur or gallop  Lungs-clear to auscultation  Abdomen-soft, positive bowel sounds, without masses, HSmegaly or pain     Assessment/Plan     Problem List Items Addressed This Visit       Abnormality of gait    Anemia    Chronic epilepsy not affecting current episode of care (CMS/[facility])    Hyperlipidemia    Relevant Orders    POCT fingerstick glucose  manually resulted    POCT Lipid Panel manually resulted    Hypertension    Relevant Medications    lisinopril 10 mg tablet    Inclusion body myositis (IBM) - Primary    Sacroiliitis, not elsewhere classified (CMS/HCC)     Other Visit Diagnoses       Encounter for immunization        Relevant Orders    Flu vaccine, quadrivalent, high-dose, preservative free, age 65y+ (FLUZONE)        Quitting smoking is the single most important thing you can do for your health.  We can help.     Return in one week to be reevaluated on the increased Lisinopril and then follow up fasting (no alcohol for 48 hours and just water for 14 hours) in six months for your next routine appointment.  In general, take any medications on schedule (except for types of Insulin).        Roberto Marshall MD   Patient was identified as a fall risk. Risk prevention instructions provided.

## 2023-11-06 ENCOUNTER — LAB (OUTPATIENT)
Dept: LAB | Facility: LAB | Age: 73
End: 2023-11-06
Payer: MEDICARE

## 2023-11-06 DIAGNOSIS — R73.9 HYPERGLYCEMIA: ICD-10-CM

## 2023-11-06 LAB
GLUCOSE 2H P 75 G GLC PO SERPL-MCNC: 103 MG/DL
GLUCOSE P FAST SERPL-MCNC: 79 MG/DL

## 2023-11-06 PROCEDURE — 82947 ASSAY GLUCOSE BLOOD QUANT: CPT

## 2023-11-06 PROCEDURE — 36415 COLL VENOUS BLD VENIPUNCTURE: CPT

## 2023-11-06 PROCEDURE — 82950 GLUCOSE TEST: CPT

## 2023-11-08 DIAGNOSIS — I10 HYPERTENSION, UNSPECIFIED TYPE: ICD-10-CM

## 2023-11-09 RX ORDER — HYDRALAZINE HYDROCHLORIDE 50 MG/1
50 TABLET, FILM COATED ORAL 3 TIMES DAILY
Qty: 270 TABLET | Refills: 1 | OUTPATIENT
Start: 2023-11-09

## 2023-11-09 NOTE — PROGRESS NOTES
Subjective   Patient ID: [id number]     Lianna Nuñez is a 73 y.o. female who presents for No chief complaint on file..    BHARATHI Dsouza has been taking the increased dosage of Lisinopril and feeling fine without cramping or coughing    Review of Systems  CARDIO- No dizziness, or syncope with or without exertion  GI-No constipation or diarrhea  PULM-No coughing     Objective     There were no vitals taken for this visit.     Physical Exam  Neck-supple without lymphadenopathy or thyromegaly; no carotid bruits  Heart- regular rate and rhythm, normal s1 and s2 without murmur or gallop  Lungs-clear to auscultation    Assessment/Plan     Problem List Items Addressed This Visit       Hypertension - Primary    Relevant Orders    Basic metabolic panel     You are eligible for the COVID & RSV booster.    Please schedule your bone density    Follow up fasting (no alcohol for 48 hours and just water for 14 hours) in six months for your next routine appointment.  In general, take any medications on schedule (except for types of Insulin).      Roberto Marshall MD

## 2023-11-09 NOTE — PATIENT INSTRUCTIONS
You are eligible for the COVID & RSV booster.    Please schedule your bone density    Follow up fasting (no alcohol for 48 hours and just water for 14 hours) in six months for your next routine appointment.  In general, take any medications on schedule (except for types of Insulin).

## 2023-11-10 ENCOUNTER — OFFICE VISIT (OUTPATIENT)
Dept: PRIMARY CARE | Facility: CLINIC | Age: 73
End: 2023-11-10
Payer: MEDICARE

## 2023-11-10 VITALS
WEIGHT: 132 LBS | SYSTOLIC BLOOD PRESSURE: 142 MMHG | DIASTOLIC BLOOD PRESSURE: 68 MMHG | TEMPERATURE: 98 F | BODY MASS INDEX: 24.94 KG/M2

## 2023-11-10 DIAGNOSIS — I10 HYPERTENSION, UNSPECIFIED TYPE: Primary | ICD-10-CM

## 2023-11-10 PROCEDURE — 3078F DIAST BP <80 MM HG: CPT | Performed by: FAMILY MEDICINE

## 2023-11-10 PROCEDURE — 1160F RVW MEDS BY RX/DR IN RCRD: CPT | Performed by: FAMILY MEDICINE

## 2023-11-10 PROCEDURE — 99213 OFFICE O/P EST LOW 20 MIN: CPT | Performed by: FAMILY MEDICINE

## 2023-11-10 PROCEDURE — 1159F MED LIST DOCD IN RCRD: CPT | Performed by: FAMILY MEDICINE

## 2023-11-10 PROCEDURE — 3077F SYST BP >= 140 MM HG: CPT | Performed by: FAMILY MEDICINE

## 2023-11-13 ENCOUNTER — TELEPHONE (OUTPATIENT)
Dept: PRIMARY CARE | Facility: CLINIC | Age: 73
End: 2023-11-13
Payer: MEDICARE

## 2023-11-13 DIAGNOSIS — I10 HYPERTENSION, UNSPECIFIED TYPE: ICD-10-CM

## 2023-11-13 RX ORDER — HYDRALAZINE HYDROCHLORIDE 50 MG/1
50 TABLET, FILM COATED ORAL 3 TIMES DAILY
Qty: 270 TABLET | Refills: 0 | Status: SHIPPED | OUTPATIENT
Start: 2023-11-13 | End: 2024-03-14 | Stop reason: SDUPTHER

## 2023-11-13 NOTE — TELEPHONE ENCOUNTER
Refill:    HydrALAZINE (Apresoline) 50 mg take one tab 3 times a day    [facility] [phone]    LR: 10/16/23 qty 90 no refills  LV: 11/10/23   NV: 05/10/24

## 2023-11-20 ENCOUNTER — TELEPHONE (OUTPATIENT)
Dept: PRIMARY CARE | Facility: CLINIC | Age: 73
End: 2023-11-20
Payer: MEDICARE

## 2023-11-21 NOTE — TELEPHONE ENCOUNTER
Pt states that she does not need to sched with PCP, she states that she has a follow up with her Surgeon.

## 2023-12-03 DIAGNOSIS — E78.5 HYPERLIPIDEMIA, UNSPECIFIED: ICD-10-CM

## 2023-12-04 RX ORDER — SIMVASTATIN 10 MG/1
TABLET, FILM COATED ORAL
Qty: 90 TABLET | Refills: 0 | OUTPATIENT
Start: 2023-12-04

## 2023-12-10 DIAGNOSIS — N18.30 STAGE 3 CHRONIC KIDNEY DISEASE, UNSPECIFIED WHETHER STAGE 3A OR 3B CKD (MULTI): Chronic | ICD-10-CM

## 2023-12-11 RX ORDER — ALLOPURINOL 100 MG/1
100 TABLET ORAL DAILY
Qty: 90 TABLET | Refills: 0 | OUTPATIENT
Start: 2023-12-11

## 2023-12-17 ENCOUNTER — LAB REQUISITION (OUTPATIENT)
Dept: LAB | Facility: HOSPITAL | Age: 73
End: 2023-12-17
Payer: MEDICARE

## 2023-12-17 DIAGNOSIS — R41.82 ALTERED MENTAL STATUS, UNSPECIFIED: ICD-10-CM

## 2023-12-17 LAB
ALBUMIN SERPL BCP-MCNC: 3.4 G/DL (ref 3.4–5)
ALP SERPL-CCNC: 90 U/L (ref 33–136)
ALT SERPL W P-5'-P-CCNC: 5 U/L (ref 7–45)
ANION GAP SERPL CALC-SCNC: 13 MMOL/L (ref 10–20)
AST SERPL W P-5'-P-CCNC: 16 U/L (ref 9–39)
BILIRUB SERPL-MCNC: 0.5 MG/DL (ref 0–1.2)
BUN SERPL-MCNC: 27 MG/DL (ref 6–23)
CALCIUM SERPL-MCNC: 8.6 MG/DL (ref 8.6–10.3)
CHLORIDE SERPL-SCNC: 103 MMOL/L (ref 98–107)
CO2 SERPL-SCNC: 22 MMOL/L (ref 21–32)
CREAT SERPL-MCNC: 0.92 MG/DL (ref 0.5–1.05)
ERYTHROCYTE [DISTWIDTH] IN BLOOD BY AUTOMATED COUNT: 14.7 % (ref 11.5–14.5)
GFR SERPL CREATININE-BSD FRML MDRD: 65 ML/MIN/1.73M*2
GLUCOSE SERPL-MCNC: 97 MG/DL (ref 74–99)
HCT VFR BLD AUTO: 30.4 % (ref 36–46)
HGB BLD-MCNC: 9.7 G/DL (ref 12–16)
MCH RBC QN AUTO: 32.7 PG (ref 26–34)
MCHC RBC AUTO-ENTMCNC: 31.9 G/DL (ref 32–36)
MCV RBC AUTO: 102 FL (ref 80–100)
NRBC BLD-RTO: 0 /100 WBCS (ref 0–0)
PLATELET # BLD AUTO: 286 X10*3/UL (ref 150–450)
POTASSIUM SERPL-SCNC: 4.2 MMOL/L (ref 3.5–5.3)
PROT SERPL-MCNC: 6.3 G/DL (ref 6.4–8.2)
RBC # BLD AUTO: 2.97 X10*6/UL (ref 4–5.2)
SODIUM SERPL-SCNC: 134 MMOL/L (ref 136–145)
WBC # BLD AUTO: 13.3 X10*3/UL (ref 4.4–11.3)

## 2023-12-17 PROCEDURE — 80053 COMPREHEN METABOLIC PANEL: CPT

## 2023-12-17 PROCEDURE — 85027 COMPLETE CBC AUTOMATED: CPT

## 2024-01-02 ENCOUNTER — TELEPHONE (OUTPATIENT)
Dept: PRIMARY CARE | Facility: CLINIC | Age: 74
End: 2024-01-02
Payer: MEDICARE

## 2024-01-16 ENCOUNTER — TELEPHONE (OUTPATIENT)
Dept: PRIMARY CARE | Facility: CLINIC | Age: 74
End: 2024-01-16
Payer: MEDICARE

## 2024-01-16 DIAGNOSIS — N18.30 STAGE 3 CHRONIC KIDNEY DISEASE, UNSPECIFIED WHETHER STAGE 3A OR 3B CKD (MULTI): Chronic | ICD-10-CM

## 2024-01-16 RX ORDER — FUROSEMIDE 20 MG/1
20 TABLET ORAL DAILY
Qty: 90 TABLET | Refills: 1 | Status: SHIPPED | OUTPATIENT
Start: 2024-01-16 | End: 2024-07-14

## 2024-01-16 RX ORDER — ALLOPURINOL 100 MG/1
100 TABLET ORAL DAILY
Qty: 90 TABLET | Refills: 1 | Status: SHIPPED | OUTPATIENT
Start: 2024-01-16 | End: 2024-07-14

## 2024-01-16 NOTE — TELEPHONE ENCOUNTER
Refill:    1) Allopurinol 100mg daily  LR: 09/14/23 qty 90 no refills    2) Furosimide 20mg daily  LR:  04/14/22 qty 90 w/ 1 refill Pt states that this was prescribed when pt was in [facility]    [facility] [phone]    LV: 12/14/23  NV: 05/10/24

## 2024-01-30 ENCOUNTER — TELEPHONE (OUTPATIENT)
Dept: PRIMARY CARE | Facility: CLINIC | Age: 74
End: 2024-01-30
Payer: MEDICARE

## 2024-01-30 NOTE — TELEPHONE ENCOUNTER
Received vm from Elen with [facility] with a concern for this pt. Her weight has gone from 122lb to 113lb in the past couple weeks.

## 2024-02-04 PROBLEM — N18.32 CHRONIC KIDNEY DISEASE, STAGE 3B (MULTI): Status: ACTIVE | Noted: 2021-12-28

## 2024-02-05 ENCOUNTER — OFFICE VISIT (OUTPATIENT)
Dept: PRIMARY CARE | Facility: CLINIC | Age: 74
End: 2024-02-05
Payer: MEDICARE

## 2024-02-05 VITALS
DIASTOLIC BLOOD PRESSURE: 74 MMHG | TEMPERATURE: 98.7 F | BODY MASS INDEX: 23.24 KG/M2 | SYSTOLIC BLOOD PRESSURE: 123 MMHG | WEIGHT: 123 LBS

## 2024-02-05 DIAGNOSIS — Q23.1 BICUSPID AORTIC VALVE (HHS-HCC): ICD-10-CM

## 2024-02-05 DIAGNOSIS — D63.1 ANEMIA DUE TO STAGE 3A CHRONIC KIDNEY DISEASE (MULTI): ICD-10-CM

## 2024-02-05 DIAGNOSIS — E44.0 MALNUTRITION OF MODERATE DEGREE (MULTI): ICD-10-CM

## 2024-02-05 DIAGNOSIS — N18.31 ANEMIA DUE TO STAGE 3A CHRONIC KIDNEY DISEASE (MULTI): ICD-10-CM

## 2024-02-05 DIAGNOSIS — K52.831 COLLAGENOUS COLITIS: ICD-10-CM

## 2024-02-05 DIAGNOSIS — M46.1 SACROILIITIS, NOT ELSEWHERE CLASSIFIED (CMS-HCC): ICD-10-CM

## 2024-02-05 DIAGNOSIS — M85.80 OSTEOPENIA, UNSPECIFIED LOCATION: ICD-10-CM

## 2024-02-05 DIAGNOSIS — G40.909: ICD-10-CM

## 2024-02-05 DIAGNOSIS — F17.200 NICOTINE USE DISORDER: ICD-10-CM

## 2024-02-05 DIAGNOSIS — G72.41 INCLUSION BODY MYOSITIS (IBM) (CMS-HCC): ICD-10-CM

## 2024-02-05 DIAGNOSIS — I48.0 PAF (PAROXYSMAL ATRIAL FIBRILLATION) (MULTI): ICD-10-CM

## 2024-02-05 DIAGNOSIS — I77.819 AORTIC ECTASIA (CMS-HCC): ICD-10-CM

## 2024-02-05 DIAGNOSIS — N18.32 CHRONIC KIDNEY DISEASE, STAGE 3B (MULTI): ICD-10-CM

## 2024-02-05 DIAGNOSIS — I50.22 CHRONIC SYSTOLIC CONGESTIVE HEART FAILURE (MULTI): ICD-10-CM

## 2024-02-05 DIAGNOSIS — R63.4 WEIGHT LOSS, NON-INTENTIONAL: Primary | ICD-10-CM

## 2024-02-05 PROBLEM — S83.91XA RIGHT KNEE SPRAIN: Status: RESOLVED | Noted: 2023-04-12 | Resolved: 2024-02-05

## 2024-02-05 PROBLEM — R79.0 LOW BLOOD MAGNESIUM LEVEL: Status: RESOLVED | Noted: 2023-04-12 | Resolved: 2024-02-05

## 2024-02-05 PROBLEM — S50.02XA CONTUSION OF LEFT ELBOW: Status: RESOLVED | Noted: 2021-09-17 | Resolved: 2024-02-05

## 2024-02-05 PROCEDURE — 99214 OFFICE O/P EST MOD 30 MIN: CPT | Performed by: FAMILY MEDICINE

## 2024-02-05 PROCEDURE — 1160F RVW MEDS BY RX/DR IN RCRD: CPT | Performed by: FAMILY MEDICINE

## 2024-02-05 PROCEDURE — 1159F MED LIST DOCD IN RCRD: CPT | Performed by: FAMILY MEDICINE

## 2024-02-05 PROCEDURE — 3074F SYST BP LT 130 MM HG: CPT | Performed by: FAMILY MEDICINE

## 2024-02-05 PROCEDURE — 3078F DIAST BP <80 MM HG: CPT | Performed by: FAMILY MEDICINE

## 2024-02-05 RX ORDER — ALENDRONATE SODIUM 70 MG/1
70 TABLET ORAL
Qty: 12 TABLET | Refills: 0 | Status: SHIPPED | OUTPATIENT
Start: 2024-02-05 | End: 2024-04-26

## 2024-02-05 NOTE — PATIENT INSTRUCTIONS
Please check with your insurance to verify whether you should get your shingles vaccination here or at the pharmacy, and whether it is covered.  The purpose of this shot is to prevent the permanent unremitting pain of Post Herpetic Neuralgia which becomes more common in elderly patients that get Shingles.  This shot can be very expensive.    You are eligible for the COVID booster.    Quitting smoking is the single most important thing you can do for your health.  We can help.     We will call you with your results.

## 2024-02-05 NOTE — PROGRESS NOTES
Subjective   Patient ID: [id number]     Lianna Nuñez is a 73 y.o. female who presents for Med Management.    HPI  Lianna is here for unintentional weight loss (9# lighter than NOV2023) and multiorgan dysfunction;  She takes Ensure one per day    Review of Systems  CARDIO- No chest pain or pressure, nausea, diaphoresis, paresthesias, dizziness, or syncope with or without exertion;  no orthopnea  GI-No blood in stool, tarry stools, pain, vomiting, heartburn, constipation or diarrhea;  last attack of collagenous colitis a was ten days ago for four days  PULM-No wheezing, coughing or shortness of breath  UROL-No frequency, urgency, blood in urine, or incontinence    Objective     /74 (BP Location: Left arm, Patient Position: Sitting, BP Cuff Size: Adult)   Temp 37.1 °C (98.7 °F) (Oral)   Wt 55.8 kg (123 lb)   BMI 23.24 kg/m²      Physical Exam  Neck-supple without lymphadenopathy or thyromegaly; no carotid bruits  Throat- without erythema or exudate, uvula in midlineNeck-supple without lymphadenopathy or thyromegaly; no carotid bruits  Heart- regular rate and rhythm, normal s1 and s2 without murmur or gallop  Lungs-clear to auscultation  Abdomen-soft, positive bowel sounds, without masses, HSmegaly or pain     Assessment/Plan     Problem List Items Addressed This Visit       Anemia    Aortic ectasia (CMS/HCC)    PAF (paroxysmal atrial fibrillation) (CMS/[facility])    Bicuspid aortic valve    Relevant Orders    Transthoracic Echo (TTE) Limited    Chronic epilepsy not affecting current episode of care (CMS/[facility])     No seizure in over ten years         Chronic systolic congestive heart failure (CMS/HCC)    Chronic kidney disease, stage 3b (CMS/HCC)    Relevant Orders    Comprehensive metabolic panel    Collagenous colitis    Malnutrition of moderate degree (CMS/HCC)    Nicotine use disorder    Osteopenia    Relevant Medications    alendronate (Fosamax) 70 mg tablet    Inclusion body myositis (IBM)     Sacroiliitis, not elsewhere classified (CMS/[facility])     Other Visit Diagnoses       Weight loss, non-intentional    -  Primary    Relevant Orders    CBC and Auto Differential    TSH          Please check with your insurance to verify whether you should get your shingles vaccination here or at the pharmacy, and whether it is covered.  The purpose of this shot is to prevent the permanent unremitting pain of Post Herpetic Neuralgia which becomes more common in elderly patients that get Shingles.  This shot can be very expensive.    You are eligible for the COVID booster.    Quitting smoking is the single most important thing you can do for your health.  We can help.     We will call you with your results.    Please return in one month to be reevaluated for your weight loss.    Roberto Marshall MD

## 2024-02-10 DIAGNOSIS — I10 HYPERTENSION, UNSPECIFIED TYPE: ICD-10-CM

## 2024-02-12 RX ORDER — HYDRALAZINE HYDROCHLORIDE 50 MG/1
50 TABLET, FILM COATED ORAL 3 TIMES DAILY
Qty: 270 TABLET | Refills: 0 | OUTPATIENT
Start: 2024-02-12

## 2024-02-27 DIAGNOSIS — I10 HYPERTENSION, UNSPECIFIED TYPE: ICD-10-CM

## 2024-02-27 RX ORDER — METOPROLOL SUCCINATE 50 MG/1
50 TABLET, EXTENDED RELEASE ORAL DAILY
Qty: 90 TABLET | Refills: 1 | OUTPATIENT
Start: 2024-02-27

## 2024-03-01 ENCOUNTER — PATIENT OUTREACH (OUTPATIENT)
Dept: CARE COORDINATION | Facility: CLINIC | Age: 74
End: 2024-03-01
Payer: MEDICARE

## 2024-03-01 NOTE — PROGRESS NOTES
[facility] ED  2/29/2024 ED Visit  Patient fell forward on her knees and then fell backwards hitting her head.  Dx: Fall, head injury    Outreach call to patient to support a smooth transition of care from recent admission.  Patient states, she is doing much better. Ambulating with a walker. States, the bump on the back of her head is going down. Denies headache and blurry vision. CM discussed reason's to return to ED. Patient no questions at this time.    Gianna Duval RN/CM  [facility]  [phone]

## 2024-03-07 ENCOUNTER — HOSPITAL ENCOUNTER (OUTPATIENT)
Dept: CARDIOLOGY | Facility: CLINIC | Age: 74
Discharge: HOME | End: 2024-03-07
Payer: MEDICARE

## 2024-03-07 DIAGNOSIS — Q23.1 BICUSPID AORTIC VALVE (HHS-HCC): ICD-10-CM

## 2024-03-07 LAB
AORTIC VALVE MEAN GRADIENT: 8.5 MMHG
AORTIC VALVE PEAK VELOCITY: 1.9 M/S
AV PEAK GRADIENT: 14.4 MMHG
AVA (PEAK VEL): 1.35 CM2
AVA (VTI): 1.33 CM2
LEFT ATRIUM VOLUME AREA LENGTH INDEX BSA: 68 ML/M2
LEFT VENTRICLE INTERNAL DIMENSION DIASTOLE: 4.56 CM (ref 3.5–6)
LEFT VENTRICULAR OUTFLOW TRACT DIAMETER: 2.02 CM
RIGHT VENTRICLE PEAK SYSTOLIC PRESSURE: 40.1 MMHG

## 2024-03-07 PROCEDURE — 93325 DOPPLER ECHO COLOR FLOW MAPG: CPT | Performed by: INTERNAL MEDICINE

## 2024-03-07 PROCEDURE — 93308 TTE F-UP OR LMTD: CPT | Performed by: INTERNAL MEDICINE

## 2024-03-07 PROCEDURE — 93325 DOPPLER ECHO COLOR FLOW MAPG: CPT

## 2024-03-07 PROCEDURE — 93321 DOPPLER ECHO F-UP/LMTD STD: CPT | Performed by: INTERNAL MEDICINE

## 2024-03-14 ENCOUNTER — TELEPHONE (OUTPATIENT)
Dept: PRIMARY CARE | Facility: CLINIC | Age: 74
End: 2024-03-14
Payer: MEDICARE

## 2024-03-14 DIAGNOSIS — I10 HYPERTENSION, UNSPECIFIED TYPE: ICD-10-CM

## 2024-03-14 RX ORDER — HYDRALAZINE HYDROCHLORIDE 50 MG/1
50 TABLET, FILM COATED ORAL 3 TIMES DAILY
Qty: 270 TABLET | Refills: 0 | Status: SHIPPED | OUTPATIENT
Start: 2024-03-14 | End: 2024-06-12

## 2024-03-14 NOTE — TELEPHONE ENCOUNTER
JT PATIENT - KR IS COVERING THIS MORNING    REFILL REQUEST    Med: Hydralazine   Med Dose: 50 mg  Med Frequency: one tab three times daily    [facility]  [facility] Address: [address]    LR: 11/13/2023  LV: 02/05/2024  NV: 05/07/2024

## 2024-04-11 ENCOUNTER — TELEPHONE (OUTPATIENT)
Dept: PRIMARY CARE | Facility: CLINIC | Age: 74
End: 2024-04-11
Payer: MEDICARE

## 2024-04-11 DIAGNOSIS — E78.5 HYPERLIPIDEMIA, UNSPECIFIED: ICD-10-CM

## 2024-04-11 RX ORDER — SIMVASTATIN 10 MG/1
10 TABLET, FILM COATED ORAL NIGHTLY
Qty: 90 TABLET | Refills: 0 | Status: SHIPPED | OUTPATIENT
Start: 2024-04-11

## 2024-04-11 NOTE — TELEPHONE ENCOUNTER
Pt is Dr. BLACKWOOD 's pt and CIRO FOX  is covering    Refill:    Simvastatin 10mg daily    [facility] [phone]    LR: 09/08/23 qty 90 no refills  LV: 02/05/24  NV: 05/07/24

## 2024-04-26 DIAGNOSIS — M85.80 OSTEOPENIA, UNSPECIFIED LOCATION: ICD-10-CM

## 2024-04-26 RX ORDER — ALENDRONATE SODIUM 70 MG/1
70 TABLET ORAL
Qty: 12 TABLET | Refills: 0 | Status: SHIPPED | OUTPATIENT
Start: 2024-04-26

## 2024-05-07 ENCOUNTER — OFFICE VISIT (OUTPATIENT)
Dept: PRIMARY CARE | Facility: CLINIC | Age: 74
End: 2024-05-07
Payer: MEDICARE

## 2024-05-07 VITALS
WEIGHT: 127 LBS | BODY MASS INDEX: 23.98 KG/M2 | DIASTOLIC BLOOD PRESSURE: 70 MMHG | TEMPERATURE: 98.4 F | SYSTOLIC BLOOD PRESSURE: 106 MMHG | HEIGHT: 61 IN

## 2024-05-07 DIAGNOSIS — M85.80 OSTEOPENIA, UNSPECIFIED LOCATION: ICD-10-CM

## 2024-05-07 DIAGNOSIS — F33.9 EPISODE OF RECURRENT MAJOR DEPRESSIVE DISORDER, UNSPECIFIED DEPRESSION EPISODE SEVERITY (CMS-HCC): ICD-10-CM

## 2024-05-07 DIAGNOSIS — Z00.00 ROUTINE GENERAL MEDICAL EXAMINATION AT HEALTH CARE FACILITY: Primary | ICD-10-CM

## 2024-05-07 DIAGNOSIS — G72.41 INCLUSION BODY MYOSITIS (IBM) (CMS-HCC): ICD-10-CM

## 2024-05-07 DIAGNOSIS — Z12.31 ENCOUNTER FOR SCREENING MAMMOGRAM FOR MALIGNANT NEOPLASM OF BREAST: ICD-10-CM

## 2024-05-07 PROCEDURE — 1123F ACP DISCUSS/DSCN MKR DOCD: CPT | Performed by: FAMILY MEDICINE

## 2024-05-07 PROCEDURE — 3078F DIAST BP <80 MM HG: CPT | Performed by: FAMILY MEDICINE

## 2024-05-07 PROCEDURE — 1159F MED LIST DOCD IN RCRD: CPT | Performed by: FAMILY MEDICINE

## 2024-05-07 PROCEDURE — 99213 OFFICE O/P EST LOW 20 MIN: CPT | Performed by: FAMILY MEDICINE

## 2024-05-07 PROCEDURE — 3074F SYST BP LT 130 MM HG: CPT | Performed by: FAMILY MEDICINE

## 2024-05-07 PROCEDURE — 1160F RVW MEDS BY RX/DR IN RCRD: CPT | Performed by: FAMILY MEDICINE

## 2024-05-07 PROCEDURE — 1170F FXNL STATUS ASSESSED: CPT | Performed by: FAMILY MEDICINE

## 2024-05-07 PROCEDURE — G0439 PPPS, SUBSEQ VISIT: HCPCS | Performed by: FAMILY MEDICINE

## 2024-05-07 RX ORDER — SERTRALINE HYDROCHLORIDE 50 MG/1
50 TABLET, FILM COATED ORAL DAILY
Qty: 30 TABLET | Refills: 1 | Status: SHIPPED | OUTPATIENT
Start: 2024-05-07 | End: 2024-07-06

## 2024-05-07 ASSESSMENT — ACTIVITIES OF DAILY LIVING (ADL)
DRESSING: INDEPENDENT
MANAGING_FINANCES: INDEPENDENT
GROCERY_SHOPPING: INDEPENDENT
TAKING_MEDICATION: INDEPENDENT
DOING_HOUSEWORK: INDEPENDENT
BATHING: INDEPENDENT

## 2024-05-07 ASSESSMENT — ENCOUNTER SYMPTOMS
OCCASIONAL FEELINGS OF UNSTEADINESS: 0
WEAKNESS: 1
DEPRESSION: 0
DYSPHORIC MOOD: 1
LOSS OF SENSATION IN FEET: 0

## 2024-05-07 ASSESSMENT — PATIENT HEALTH QUESTIONNAIRE - PHQ9
1. LITTLE INTEREST OR PLEASURE IN DOING THINGS: NEARLY EVERY DAY
8. MOVING OR SPEAKING SO SLOWLY THAT OTHER PEOPLE COULD HAVE NOTICED. OR THE OPPOSITE, BEING SO FIGETY OR RESTLESS THAT YOU HAVE BEEN MOVING AROUND A LOT MORE THAN USUAL: SEVERAL DAYS
10. IF YOU CHECKED OFF ANY PROBLEMS, HOW DIFFICULT HAVE THESE PROBLEMS MADE IT FOR YOU TO DO YOUR WORK, TAKE CARE OF THINGS AT HOME, OR GET ALONG WITH OTHER PEOPLE: SOMEWHAT DIFFICULT
5. POOR APPETITE OR OVEREATING: NEARLY EVERY DAY
7. TROUBLE CONCENTRATING ON THINGS, SUCH AS READING THE NEWSPAPER OR WATCHING TELEVISION: SEVERAL DAYS
SUM OF ALL RESPONSES TO PHQ9 QUESTIONS 1 AND 2: 6
6. FEELING BAD ABOUT YOURSELF - OR THAT YOU ARE A FAILURE OR HAVE LET YOURSELF OR YOUR FAMILY DOWN: NOT AT ALL
SUM OF ALL RESPONSES TO PHQ QUESTIONS 1-9: 13
2. FEELING DOWN, DEPRESSED OR HOPELESS: NEARLY EVERY DAY
3. TROUBLE FALLING OR STAYING ASLEEP OR SLEEPING TOO MUCH: SEVERAL DAYS
4. FEELING TIRED OR HAVING LITTLE ENERGY: SEVERAL DAYS
9. THOUGHTS THAT YOU WOULD BE BETTER OFF DEAD, OR OF HURTING YOURSELF: NOT AT ALL

## 2024-05-07 NOTE — PROGRESS NOTES
"Subjective   Reason for Visit: Lianna Nuñez is an 73 y.o. female here for a Medicare Wellness visit.               HPI    Patient Care Team:  Roberto Marshall MD as PCP - General  Roberto Marshall MD as PCP - [employer] PCP     Review of Systems    Objective   Vitals:  /70 (BP Location: Left arm, Patient Position: Sitting)   Temp 36.9 °C (98.4 °F) (Oral)   Ht 1.549 m (5' 1\")   Wt 57.6 kg (127 lb)   BMI 24.00 kg/m²       Physical Exam    Assessment/Plan   Problem List Items Addressed This Visit    None         "

## 2024-05-07 NOTE — PROGRESS NOTES
"Subjective   Reason for Visit: Lianna Nuñez is an 73 y.o. female here for a Medicare Wellness visit.     Past Medical, Surgical, and Family History reviewed and updated in chart.    Reviewed all medications by prescribing practitioner or clinical pharmacist (such as prescriptions, OTCs, herbal therapies and supplements) and documented in the medical record.    HPI    She reports increased depression since dx of inclusion body myositis and would like prescription treatment to assist with her mood at this time. She has been using a walker consistently and notes some weakness in her muscles, left leg greater then her right. She will be doing therapy to help.     Patient Care Team:  Roberto Marshall MD as PCP - General  Roberto Marshall MD as PCP - [employer] PCP     Review of Systems   Neurological:  Positive for weakness.   Psychiatric/Behavioral:  Positive for dysphoric mood.        Objective   Vitals:  /70 (BP Location: Left arm, Patient Position: Sitting)   Temp 36.9 °C (98.4 °F) (Oral)   Ht 1.549 m (5' 1\")   Wt 57.6 kg (127 lb)   BMI 24.00 kg/m²       Physical Exam  Constitutional:       Appearance: Normal appearance.   HENT:      Head: Normocephalic and atraumatic.   Cardiovascular:      Rate and Rhythm: Normal rate and regular rhythm.   Pulmonary:      Effort: Pulmonary effort is normal.   Skin:     General: Skin is warm and dry.   Neurological:      Mental Status: She is alert.         Assessment/Plan   Problem List Items Addressed This Visit       Depression    Current Assessment & Plan     - start Sertraline 50 mg PO daily  - follow up 4-6 weeks, sooner as needed         Relevant Medications    sertraline (Zoloft) 50 mg tablet    Osteopenia    Inclusion body myositis (IBM) (CMS-[facility])    Overview     Smith Magaña MD at [facility] (previously saw Dr. Martin)         Current Assessment & Plan     - Continue care with neurology: [facility] Smith Magaña MD           Other Visit " Diagnoses       Routine general medical examination at health care facility    -  Primary    Relevant Orders    1 Year Follow Up In Primary Care - Wellness Exam    Encounter for screening mammogram for malignant neoplasm of breast        Relevant Orders    BI mammo bilateral screening tomosynthesis        Mammogram ordered to be done after 5/26/2024  Last LDCT scan of the chest 5/5/2022, last CT chest 12/4/2023  Last Dexa 12/29/2022  Last colonoscopy 5/12/2021, last EGD 6/3/2022  Recommended her to get her Shingrix vaccine (2/2)  Pneumococcal vaccination is up to date for age  Last Tdap 3/27/2019, due 3/2029  Recommend annual influenza vaccinations and COVID-19 boosters when due  RSV vaccination is an optional vaccination for her

## 2024-05-08 DIAGNOSIS — I10 HYPERTENSION, UNSPECIFIED TYPE: ICD-10-CM

## 2024-05-08 RX ORDER — LISINOPRIL 10 MG/1
10 TABLET ORAL DAILY
Qty: 90 TABLET | Refills: 1 | OUTPATIENT
Start: 2024-05-08

## 2024-05-10 ENCOUNTER — APPOINTMENT (OUTPATIENT)
Dept: PRIMARY CARE | Facility: CLINIC | Age: 74
End: 2024-05-10
Payer: MEDICARE

## 2024-05-29 DIAGNOSIS — F33.9 EPISODE OF RECURRENT MAJOR DEPRESSIVE DISORDER, UNSPECIFIED DEPRESSION EPISODE SEVERITY (CMS-HCC): ICD-10-CM

## 2024-05-30 RX ORDER — SERTRALINE HYDROCHLORIDE 50 MG/1
50 TABLET, FILM COATED ORAL DAILY
Qty: 90 TABLET | Refills: 1 | OUTPATIENT
Start: 2024-05-30

## 2024-06-05 ENCOUNTER — APPOINTMENT (OUTPATIENT)
Dept: RADIOLOGY | Facility: CLINIC | Age: 74
End: 2024-06-05
Payer: MEDICARE

## 2024-06-05 ENCOUNTER — TELEPHONE (OUTPATIENT)
Dept: PRIMARY CARE | Facility: CLINIC | Age: 74
End: 2024-06-05
Payer: MEDICARE

## 2024-06-12 ENCOUNTER — APPOINTMENT (OUTPATIENT)
Dept: PRIMARY CARE | Facility: CLINIC | Age: 74
End: 2024-06-12
Payer: MEDICARE

## 2024-06-14 ENCOUNTER — PATIENT OUTREACH (OUTPATIENT)
Dept: CARE COORDINATION | Facility: CLINIC | Age: 74
End: 2024-06-14
Payer: MEDICARE

## 2024-06-14 NOTE — PROGRESS NOTES
Patient was discharged on 6/12 to [facility].     Erma Parker, RADHAN, RN

## 2024-06-17 ENCOUNTER — TELEPHONE (OUTPATIENT)
Dept: PRIMARY CARE | Facility: CLINIC | Age: 74
End: 2024-06-17
Payer: MEDICARE

## 2024-06-26 ENCOUNTER — TELEPHONE (OUTPATIENT)
Dept: PRIMARY CARE | Facility: CLINIC | Age: 74
End: 2024-06-26
Payer: MEDICARE

## 2024-06-26 NOTE — TELEPHONE ENCOUNTER
I can follow the plan of care, but this will be switched to her PCP after 7/1/2024 if he is then willing to resume care when he returns.

## 2024-06-29 DIAGNOSIS — F33.9 EPISODE OF RECURRENT MAJOR DEPRESSIVE DISORDER, UNSPECIFIED DEPRESSION EPISODE SEVERITY (CMS-HCC): ICD-10-CM

## 2024-06-29 RX ORDER — SERTRALINE HYDROCHLORIDE 50 MG/1
50 TABLET, FILM COATED ORAL DAILY
Qty: 30 TABLET | Refills: 1 | OUTPATIENT
Start: 2024-06-29

## 2024-07-01 ENCOUNTER — PATIENT OUTREACH (OUTPATIENT)
Dept: CARE COORDINATION | Facility: CLINIC | Age: 74
End: 2024-07-01
Payer: MEDICARE

## 2024-07-01 ENCOUNTER — DOCUMENTATION (OUTPATIENT)
Dept: CARE COORDINATION | Facility: CLINIC | Age: 74
End: 2024-07-01
Payer: MEDICARE

## 2024-07-08 ENCOUNTER — TELEPHONE (OUTPATIENT)
Dept: PRIMARY CARE | Facility: CLINIC | Age: 74
End: 2024-07-08
Payer: MEDICARE

## 2024-07-08 NOTE — TELEPHONE ENCOUNTER
[facility] is calling for treat and eval orders for speech, PT, OT and a POC. Is it okay to give a verbal?

## 2024-07-11 DIAGNOSIS — E78.5 HYPERLIPIDEMIA, UNSPECIFIED: ICD-10-CM

## 2024-07-11 RX ORDER — SIMVASTATIN 10 MG/1
10 TABLET, FILM COATED ORAL NIGHTLY
Qty: 90 TABLET | Refills: 0 | OUTPATIENT
Start: 2024-07-11

## 2024-07-12 ENCOUNTER — TELEPHONE (OUTPATIENT)
Dept: PRIMARY CARE | Facility: CLINIC | Age: 74
End: 2024-07-12
Payer: MEDICARE

## 2024-07-12 NOTE — TELEPHONE ENCOUNTER
JT pt-  Her dog jumped on her and now she has a gash on each arm. She wants to know if they can send nursing out to check on her wounds.

## 2024-07-16 ENCOUNTER — TELEPHONE (OUTPATIENT)
Dept: PRIMARY CARE | Facility: CLINIC | Age: 74
End: 2024-07-16
Payer: MEDICARE

## 2024-07-16 DIAGNOSIS — N18.30 STAGE 3 CHRONIC KIDNEY DISEASE, UNSPECIFIED WHETHER STAGE 3A OR 3B CKD (MULTI): Chronic | ICD-10-CM

## 2024-07-16 RX ORDER — ALLOPURINOL 100 MG/1
100 TABLET ORAL DAILY
Qty: 90 TABLET | Refills: 1 | OUTPATIENT
Start: 2024-07-16

## 2024-07-16 RX ORDER — FUROSEMIDE 20 MG/1
20 TABLET ORAL DAILY
Qty: 90 TABLET | Refills: 1 | OUTPATIENT
Start: 2024-07-16

## 2024-07-16 NOTE — TELEPHONE ENCOUNTER
Elen from [facility] is calling and wanted to let you know that she went and saw the pt and she had a 8 pound weight lost since 7/3. She stated that the pt stated that she hasn't been feeling well, has no appetite, and hasn't been eating. She tried encouraging the pt to schedule an appointment with you but she just wanted to call and let you know. She stated if you have any questions to give her a call at [phone].

## 2024-07-17 ENCOUNTER — PATIENT OUTREACH (OUTPATIENT)
Dept: CARE COORDINATION | Facility: CLINIC | Age: 74
End: 2024-07-17
Payer: MEDICARE

## 2024-07-17 NOTE — PROGRESS NOTES
Unable to reach patient for call back after patient's follow up appointment with PCP.   BETSYM with call back number for patient to call if needed   If no voicemail available call attempts x 2 were made to contact the patient to assist with any questions or concerns patient may have.

## 2024-07-19 DIAGNOSIS — M85.80 OSTEOPENIA, UNSPECIFIED LOCATION: ICD-10-CM

## 2024-07-19 RX ORDER — ALENDRONATE SODIUM 70 MG/1
70 TABLET ORAL
Qty: 12 TABLET | Refills: 0 | OUTPATIENT
Start: 2024-07-21